# Patient Record
Sex: MALE | Race: WHITE | NOT HISPANIC OR LATINO | Employment: OTHER | URBAN - METROPOLITAN AREA
[De-identification: names, ages, dates, MRNs, and addresses within clinical notes are randomized per-mention and may not be internally consistent; named-entity substitution may affect disease eponyms.]

---

## 2018-12-03 NOTE — PATIENT DISCUSSION
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 IOP stable today OU. - Plan : Monitor for changes. Advised patient to call our office when decreased vision or increased eye pain.

## 2018-12-03 NOTE — PATIENT DISCUSSION
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 Hx of [address] OU. - Plan : Monitor for changes.

## 2018-12-03 NOTE — PATIENT DISCUSSION
(H53.2) Diplopia - Assesment : Examination revealed diplopia. Explained muscle want to turn the eye outward. prism will help with deviation. Advised that diplopia can increase when patient is tired or fatigued. - Plan : Rx with prisms given.   RV 1 year Exam.

## 2018-12-05 ENCOUNTER — ESTABLISHED COMPREHENSIVE EXAM (OUTPATIENT)
Dept: URBAN - METROPOLITAN AREA CLINIC 43 | Facility: CLINIC | Age: 78
End: 2018-12-05

## 2018-12-05 DIAGNOSIS — H35.363: ICD-10-CM

## 2018-12-05 DIAGNOSIS — H35.372: ICD-10-CM

## 2018-12-05 DIAGNOSIS — Z96.1: ICD-10-CM

## 2018-12-05 PROCEDURE — 92014 COMPRE OPH EXAM EST PT 1/>: CPT

## 2018-12-05 PROCEDURE — 92015 DETERMINE REFRACTIVE STATE: CPT

## 2018-12-05 ASSESSMENT — VISUAL ACUITY
OD_SC: J8
OD_SC: 20/30+2
OS_SC: J8
OS_CC: J1
OD_CC: J1
OD_CC: 20/30
OS_SC: 20/25
OS_CC: 20/25-1

## 2018-12-05 ASSESSMENT — TONOMETRY
OD_IOP_MMHG: 16
OS_IOP_MMHG: 16

## 2019-02-22 NOTE — PATIENT DISCUSSION
(H53.2) Diplopia - Assesment : Examination revealed diplopia. Explained muscle want to turn the eye outward. prism will help with deviation. Advised that diplopia can increase when patient is tired or fatigued. - Plan : Updated Rx with less prisms given.    RTC 1 year/ Exam.

## 2019-12-10 ENCOUNTER — ESTABLISHED COMPREHENSIVE EXAM (OUTPATIENT)
Dept: URBAN - METROPOLITAN AREA CLINIC 43 | Facility: CLINIC | Age: 79
End: 2019-12-10

## 2019-12-10 DIAGNOSIS — H52.4: ICD-10-CM

## 2019-12-10 DIAGNOSIS — H52.203: ICD-10-CM

## 2019-12-10 PROCEDURE — 92014 COMPRE OPH EXAM EST PT 1/>: CPT

## 2019-12-10 PROCEDURE — 92015 DETERMINE REFRACTIVE STATE: CPT

## 2019-12-10 ASSESSMENT — TONOMETRY
OS_IOP_MMHG: 18
OD_IOP_MMHG: 19

## 2019-12-10 ASSESSMENT — VISUAL ACUITY
OD_SC: J4-
OD_SC: 20/25-2
OD_CC: 20/30-1
OS_SC: 20/25-3
OD_CC: J1
OS_CC: 20/25-3
OS_CC: J2
OS_SC: J4-

## 2021-03-31 ENCOUNTER — ESTABLISHED COMPREHENSIVE EXAM (OUTPATIENT)
Dept: URBAN - METROPOLITAN AREA CLINIC 43 | Facility: CLINIC | Age: 81
End: 2021-03-31

## 2021-03-31 DIAGNOSIS — Z01.00: ICD-10-CM

## 2021-03-31 DIAGNOSIS — H52.203: ICD-10-CM

## 2021-03-31 DIAGNOSIS — H52.4: ICD-10-CM

## 2021-03-31 PROCEDURE — 92015 DETERMINE REFRACTIVE STATE: CPT

## 2021-03-31 PROCEDURE — 92014 COMPRE OPH EXAM EST PT 1/>: CPT

## 2021-03-31 ASSESSMENT — VISUAL ACUITY
OD_SC: 20/30-2
OS_SC: J8
OD_SC: J10
OS_SC: 20/30-2

## 2021-03-31 ASSESSMENT — TONOMETRY
OD_IOP_MMHG: 12
OS_IOP_MMHG: 12

## 2022-04-01 ENCOUNTER — COMPREHENSIVE EXAM (OUTPATIENT)
Dept: URBAN - METROPOLITAN AREA CLINIC 43 | Facility: CLINIC | Age: 82
End: 2022-04-01

## 2022-04-01 DIAGNOSIS — H35.3121: ICD-10-CM

## 2022-04-01 DIAGNOSIS — H35.363: ICD-10-CM

## 2022-04-01 DIAGNOSIS — H35.3112: ICD-10-CM

## 2022-04-01 DIAGNOSIS — H35.372: ICD-10-CM

## 2022-04-01 DIAGNOSIS — H52.203: ICD-10-CM

## 2022-04-01 DIAGNOSIS — H52.4: ICD-10-CM

## 2022-04-01 PROCEDURE — 92014 COMPRE OPH EXAM EST PT 1/>: CPT

## 2022-04-01 PROCEDURE — 92015 DETERMINE REFRACTIVE STATE: CPT

## 2022-04-01 PROCEDURE — 92134 CPTRZ OPH DX IMG PST SGM RTA: CPT

## 2022-04-01 ASSESSMENT — TONOMETRY
OD_IOP_MMHG: 12
OS_IOP_MMHG: 12

## 2022-04-01 ASSESSMENT — VISUAL ACUITY
OS_PH: 20/40
OS_SC: J8-
OD_SC: J10
OD_PH: 20/40
OD_SC: 20/60-1
OS_SC: 20/40

## 2022-07-18 ENCOUNTER — PREPPED CHART (OUTPATIENT)
Dept: URBAN - METROPOLITAN AREA CLINIC 43 | Facility: CLINIC | Age: 82
End: 2022-07-18

## 2022-08-26 ENCOUNTER — FOLLOW UP (OUTPATIENT)
Dept: URBAN - METROPOLITAN AREA CLINIC 43 | Facility: CLINIC | Age: 82
End: 2022-08-26

## 2022-08-26 DIAGNOSIS — H35.3112: ICD-10-CM

## 2022-08-26 DIAGNOSIS — H35.363: ICD-10-CM

## 2022-08-26 DIAGNOSIS — H35.372: ICD-10-CM

## 2022-08-26 DIAGNOSIS — H35.3121: ICD-10-CM

## 2022-08-26 PROCEDURE — 92012 INTRM OPH EXAM EST PATIENT: CPT

## 2022-08-26 PROCEDURE — 92134 CPTRZ OPH DX IMG PST SGM RTA: CPT

## 2022-08-26 ASSESSMENT — VISUAL ACUITY
OS_PH: 20/30-1
OD_PH: 20/30+2
OD_SC: 20/40-2
OS_SC: 20/40+1

## 2022-08-26 ASSESSMENT — TONOMETRY
OS_IOP_MMHG: 14
OD_IOP_MMHG: 12

## 2023-05-10 ENCOUNTER — COMPREHENSIVE EXAM (OUTPATIENT)
Dept: URBAN - METROPOLITAN AREA CLINIC 43 | Facility: CLINIC | Age: 83
End: 2023-05-10

## 2023-05-10 DIAGNOSIS — H52.203: ICD-10-CM

## 2023-05-10 DIAGNOSIS — H35.3132: ICD-10-CM

## 2023-05-10 DIAGNOSIS — H35.363: ICD-10-CM

## 2023-05-10 DIAGNOSIS — H35.372: ICD-10-CM

## 2023-05-10 DIAGNOSIS — H52.4: ICD-10-CM

## 2023-05-10 PROCEDURE — 92014 COMPRE OPH EXAM EST PT 1/>: CPT

## 2023-05-10 PROCEDURE — 92015 DETERMINE REFRACTIVE STATE: CPT

## 2023-05-10 ASSESSMENT — VISUAL ACUITY
OU_SC: 20/20-1
OS_SC: J10
OD_SC: 20/40
OD_CC: 20/30-2
OU_CC: J1
OU_CC: 20/20
OU_SC: J6
OS_CC: 20/25-2
OS_CC: J1
OD_CC: J2
OD_SC: J8
OS_SC: 20/30

## 2023-05-10 ASSESSMENT — TONOMETRY: OS_IOP_MMHG: 15

## 2024-05-29 ENCOUNTER — COMPREHENSIVE EXAM (OUTPATIENT)
Dept: URBAN - METROPOLITAN AREA CLINIC 43 | Facility: CLINIC | Age: 84
End: 2024-05-29

## 2024-05-29 DIAGNOSIS — H52.4: ICD-10-CM

## 2024-05-29 DIAGNOSIS — H52.203: ICD-10-CM

## 2024-05-29 DIAGNOSIS — H35.372: ICD-10-CM

## 2024-05-29 DIAGNOSIS — H35.3132: ICD-10-CM

## 2024-05-29 DIAGNOSIS — H35.363: ICD-10-CM

## 2024-05-29 PROCEDURE — 92015 DETERMINE REFRACTIVE STATE: CPT

## 2024-05-29 PROCEDURE — 92014 COMPRE OPH EXAM EST PT 1/>: CPT

## 2024-05-29 ASSESSMENT — TONOMETRY
OD_IOP_MMHG: 17
OS_IOP_MMHG: 16

## 2024-05-29 ASSESSMENT — VISUAL ACUITY
OS_CC: J1
OD_SC: 20/40
OS_SC: J10
OS_CC: 20/20-1
OD_CC: J1-
OD_CC: 20/25-1
OD_SC: J3-
OS_SC: 20/30